# Patient Record
Sex: FEMALE | Race: WHITE | NOT HISPANIC OR LATINO | URBAN - METROPOLITAN AREA
[De-identification: names, ages, dates, MRNs, and addresses within clinical notes are randomized per-mention and may not be internally consistent; named-entity substitution may affect disease eponyms.]

---

## 2021-11-12 ENCOUNTER — HISTORICAL (OUTPATIENT)
Dept: ADMINISTRATIVE | Facility: HOSPITAL | Age: 66
End: 2021-11-12

## 2022-04-07 ENCOUNTER — HISTORICAL (OUTPATIENT)
Dept: ADMINISTRATIVE | Facility: HOSPITAL | Age: 67
End: 2022-04-07

## 2022-04-24 VITALS
SYSTOLIC BLOOD PRESSURE: 161 MMHG | BODY MASS INDEX: 36.32 KG/M2 | HEIGHT: 63 IN | WEIGHT: 205 LBS | DIASTOLIC BLOOD PRESSURE: 93 MMHG

## 2023-03-16 ENCOUNTER — HOSPITAL ENCOUNTER (OUTPATIENT)
Dept: WOUND CARE | Facility: HOSPITAL | Age: 68
Discharge: HOME | End: 2023-03-16
Attending: NURSE PRACTITIONER
Payer: MEDICARE

## 2023-03-16 VITALS
SYSTOLIC BLOOD PRESSURE: 137 MMHG | HEART RATE: 73 BPM | RESPIRATION RATE: 20 BRPM | DIASTOLIC BLOOD PRESSURE: 84 MMHG | TEMPERATURE: 99 F

## 2023-03-16 DIAGNOSIS — Z43.3 ENCOUNTER FOR ATTENTION TO COLOSTOMY: Primary | ICD-10-CM

## 2023-03-16 DIAGNOSIS — Z85.048 HISTORY OF RECTAL CANCER: ICD-10-CM

## 2023-03-16 DIAGNOSIS — R21 RASH: ICD-10-CM

## 2023-03-16 PROCEDURE — 99213 PR OFFICE/OUTPT VISIT, EST, LEVL III, 20-29 MIN: ICD-10-PCS | Mod: ,,, | Performed by: NURSE PRACTITIONER

## 2023-03-16 PROCEDURE — 99213 OFFICE O/P EST LOW 20 MIN: CPT | Mod: ,,, | Performed by: NURSE PRACTITIONER

## 2023-03-16 PROCEDURE — 99211 OFF/OP EST MAY X REQ PHY/QHP: CPT

## 2023-03-16 RX ORDER — IBUPROFEN 200 MG
200 TABLET ORAL
COMMUNITY

## 2023-03-16 RX ORDER — SPIRONOLACTONE 25 MG/1
25 TABLET ORAL 2 TIMES DAILY
COMMUNITY
Start: 2023-03-12

## 2023-03-16 RX ORDER — LISINOPRIL 30 MG/1
30 TABLET ORAL
COMMUNITY
Start: 2022-12-21

## 2023-03-16 RX ORDER — FUROSEMIDE 40 MG/1
TABLET ORAL
COMMUNITY
Start: 2023-03-09

## 2023-03-16 RX ORDER — MECLIZINE HCL 25MG 25 MG/1
25 TABLET, CHEWABLE ORAL
COMMUNITY
Start: 2023-01-31

## 2023-03-16 RX ORDER — LANOLIN ALCOHOL/MO/W.PET/CERES
1 CREAM (GRAM) TOPICAL
COMMUNITY
Start: 2022-10-14

## 2023-03-16 RX ORDER — POTASSIUM CHLORIDE 20 MEQ/1
20 TABLET, EXTENDED RELEASE ORAL
COMMUNITY
Start: 2022-06-20 | End: 2023-06-20

## 2023-03-16 RX ORDER — PANTOPRAZOLE SODIUM 40 MG/1
40 TABLET, DELAYED RELEASE ORAL
COMMUNITY
Start: 2021-11-12

## 2023-03-16 RX ORDER — ROSUVASTATIN CALCIUM 20 MG/1
20 TABLET, COATED ORAL NIGHTLY
COMMUNITY
Start: 2023-02-07

## 2023-03-17 PROBLEM — R21 RASH: Status: ACTIVE | Noted: 2023-03-17

## 2023-03-17 PROBLEM — Z85.048 HISTORY OF RECTAL CANCER: Status: ACTIVE | Noted: 2023-03-17

## 2023-03-17 PROBLEM — Z43.3 ENCOUNTER FOR ATTENTION TO COLOSTOMY: Status: ACTIVE | Noted: 2023-03-17

## 2023-03-17 NOTE — PROGRESS NOTES
Subjective:       Patient ID: Shirin Bianchi is a 67 y.o. female.    Chief Complaint: ostomy skin complications    67-year-old female presents to wound care clinic today for colostomy care.  Patient is having excoriations surrounding colostomy site.  Patient's oncologist is followed out of [facility], and PCP followed by Dermatology Dr. Lorenzana with [facility].  Medical history:  GERD, hypertension, hyperlipidemia, and history of rectal cancer.    Today's visit 03/16/2023:  Left abdominal quadrant colostomy with a red stoma retracted, with excoriation in rash to surrounding skin. Stoma 1.0 x3.0cm depressed 1cm, non reversible. Michael flange #[id number] and pouch [id number]. Applied colostomy belt.  Instructed create barrier prior to apply flange to protect skin. Sebastian Allen LPN assisted with colostomy care.  Will place her on an as needed basis for colostomy care.  Instructed to call the office with any questions, concerns, or new skin issues.  Verbalized understanding of all instructions.    No results found for: WBC, RBC, HGB, HCT, LABPLAT, MCV, MCH, QGFR, CREATININE, HGBA1C, PREALBUMIN, CRP, ESR  Review of Systems   Skin:  Positive for rash.   All other systems reviewed and are negative.        Objective:        Physical Exam  Vitals reviewed.   Abdominal:          Comments: Colostomy to left abdominal quadrant.   Skin:     General: Skin is warm and dry.      Capillary Refill: Capillary refill takes less than 2 seconds.      Findings: Rash present. Rash is purpuric.             Comments: Rash surrounding left colostomy site red, pruritic, no drainage.   Neurological:      Mental Status: She is alert.                  Assessment:         ICD-10-CM ICD-9-CM   1. Encounter for attention to colostomy  Z43.3 V55.3   2. Rash  R21 782.1   3. History of rectal cancer  Z85.048 V10.06         Plan:   Tissue pathology and/or culture taken:  [] Yes [x] No   Sharp debridement performed:   [] Yes [x] No   Labs ordered this  visit:   [] Yes [x] No   Imaging ordered this visit:   [] Yes [x] No         1. Encounter for attention to colostomy     Instructed and assisted with colostomy care and application.    2. Rash     Instructed to create crush surrounding stoma to protect skin.    3. History of rectal cancer     Resulting in colostomy.               Follow up if symptoms worsen or fail to improve.